# Patient Record
Sex: FEMALE | Race: BLACK OR AFRICAN AMERICAN | NOT HISPANIC OR LATINO | Employment: OTHER | URBAN - METROPOLITAN AREA
[De-identification: names, ages, dates, MRNs, and addresses within clinical notes are randomized per-mention and may not be internally consistent; named-entity substitution may affect disease eponyms.]

---

## 2018-02-22 PROBLEM — T82.598A: Status: ACTIVE | Noted: 2018-02-22

## 2020-01-13 ENCOUNTER — NURSE TRIAGE (OUTPATIENT)
Dept: ADMINISTRATIVE | Facility: CLINIC | Age: 70
End: 2020-01-13

## 2020-01-14 NOTE — TELEPHONE ENCOUNTER
Reason for Disposition   Sounds like a life-threatening emergency to the triager    Additional Information   Negative: Difficult to awaken or acting confused (e.g., disoriented, slurred speech)   Negative: Passed out (i.e., lost consciousness, collapsed and was not responding)   Negative: Shock suspected (e.g., cold/pale/clammy skin, too weak to stand, low BP, rapid pulse)    Protocols used: FLANK PAIN-A-AH  Pt called re on abx - had bleeding from vagina. Pt thinking she has urine infection and has already taken 8 pills. Passing dark black blood. Having pains in side. Pt may have kidney infection. pt states she is liver cancer pt. dizzy, nosebleed today , hip and butt also hurting. Fever three days ago. BP is 92/65. Rec EMS. Pt declines EMS stating she cant afford ambulance. Pt states that someone is coming to pick her up and take her to ED. Call back with questions